# Patient Record
Sex: MALE | Race: WHITE | NOT HISPANIC OR LATINO | Employment: FULL TIME | URBAN - METROPOLITAN AREA
[De-identification: names, ages, dates, MRNs, and addresses within clinical notes are randomized per-mention and may not be internally consistent; named-entity substitution may affect disease eponyms.]

---

## 2018-03-23 ENCOUNTER — HOSPITAL ENCOUNTER (EMERGENCY)
Facility: HOSPITAL | Age: 58
Discharge: HOME | End: 2018-03-23
Attending: EMERGENCY MEDICINE | Admitting: EMERGENCY MEDICINE

## 2018-03-23 VITALS
SYSTOLIC BLOOD PRESSURE: 162 MMHG | DIASTOLIC BLOOD PRESSURE: 78 MMHG | BODY MASS INDEX: 33.88 KG/M2 | HEART RATE: 87 BPM | TEMPERATURE: 98.4 F | OXYGEN SATURATION: 99 % | WEIGHT: 242 LBS | HEIGHT: 71 IN | RESPIRATION RATE: 16 BRPM

## 2018-03-23 DIAGNOSIS — I82.432 ACUTE DEEP VEIN THROMBOSIS (DVT) OF POPLITEAL VEIN OF LEFT LOWER EXTREMITY (HCC): Primary | ICD-10-CM

## 2018-03-23 PROCEDURE — 99283 EMERGENCY DEPT VISIT LOW MDM: CPT

## 2018-03-23 RX ORDER — NEBIVOLOL 20 MG/1
20 TABLET ORAL 2 TIMES DAILY
COMMUNITY
End: 2022-03-09

## 2018-03-23 RX ORDER — CYCLOBENZAPRINE HCL 10 MG
10 TABLET ORAL 3 TIMES DAILY PRN
COMMUNITY

## 2018-03-23 RX ADMIN — APIXABAN 10 MG: 5 TABLET, FILM COATED ORAL at 22:42

## 2022-03-09 ENCOUNTER — LAB (OUTPATIENT)
Dept: LAB | Facility: HOSPITAL | Age: 62
End: 2022-03-09

## 2022-03-09 ENCOUNTER — OFFICE VISIT (OUTPATIENT)
Dept: ENDOCRINOLOGY | Facility: CLINIC | Age: 62
End: 2022-03-09

## 2022-03-09 VITALS
HEIGHT: 71 IN | DIASTOLIC BLOOD PRESSURE: 79 MMHG | SYSTOLIC BLOOD PRESSURE: 125 MMHG | OXYGEN SATURATION: 98 % | HEART RATE: 92 BPM | BODY MASS INDEX: 32.76 KG/M2 | WEIGHT: 234 LBS

## 2022-03-09 DIAGNOSIS — E29.1 HYPOGONADISM MALE: ICD-10-CM

## 2022-03-09 DIAGNOSIS — E11.65 UNCONTROLLED TYPE 2 DIABETES MELLITUS WITH HYPERGLYCEMIA: Primary | ICD-10-CM

## 2022-03-09 DIAGNOSIS — I10 BENIGN HYPERTENSION: ICD-10-CM

## 2022-03-09 DIAGNOSIS — E89.2 HISTORY OF PARATHYROIDECTOMY: ICD-10-CM

## 2022-03-09 PROBLEM — Z98.890 HISTORY OF PARATHYROIDECTOMY: Status: ACTIVE | Noted: 2022-03-09

## 2022-03-09 PROBLEM — Z90.89 HISTORY OF PARATHYROIDECTOMY: Status: ACTIVE | Noted: 2022-03-09

## 2022-03-09 LAB
ESTRADIOL SERPL HS-MCNC: 15.4 PG/ML
EXPIRATION DATE: ABNORMAL
FSH SERPL-ACNC: 9.15 MIU/ML
GLUCOSE BLDC GLUCOMTR-MCNC: 154 MG/DL (ref 70–130)
HBA1C MFR BLD: 8.1 %
LH SERPL-ACNC: 4.92 MIU/ML
Lab: ABNORMAL
PROLACTIN SERPL-MCNC: 9.17 NG/ML (ref 4.04–15.2)

## 2022-03-09 PROCEDURE — 82947 ASSAY GLUCOSE BLOOD QUANT: CPT | Performed by: INTERNAL MEDICINE

## 2022-03-09 PROCEDURE — 84403 ASSAY OF TOTAL TESTOSTERONE: CPT

## 2022-03-09 PROCEDURE — 82670 ASSAY OF TOTAL ESTRADIOL: CPT

## 2022-03-09 PROCEDURE — 83002 ASSAY OF GONADOTROPIN (LH): CPT

## 2022-03-09 PROCEDURE — 84402 ASSAY OF FREE TESTOSTERONE: CPT

## 2022-03-09 PROCEDURE — 84146 ASSAY OF PROLACTIN: CPT

## 2022-03-09 PROCEDURE — 82627 DEHYDROEPIANDROSTERONE: CPT

## 2022-03-09 PROCEDURE — 83001 ASSAY OF GONADOTROPIN (FSH): CPT

## 2022-03-09 PROCEDURE — 99204 OFFICE O/P NEW MOD 45 MIN: CPT | Performed by: INTERNAL MEDICINE

## 2022-03-09 RX ORDER — CYCLOBENZAPRINE HCL 5 MG
TABLET ORAL
COMMUNITY
Start: 2022-01-20 | End: 2022-03-09

## 2022-03-09 RX ORDER — ALBUTEROL SULFATE 90 UG/1
180 AEROSOL, METERED RESPIRATORY (INHALATION)
COMMUNITY

## 2022-03-09 RX ORDER — AMLODIPINE BESYLATE 5 MG/1
10 TABLET ORAL DAILY
COMMUNITY
Start: 2022-02-24 | End: 2023-01-17

## 2022-03-09 RX ORDER — ASPIRIN 81 MG/1
81 TABLET ORAL
COMMUNITY

## 2022-03-09 RX ORDER — MOMETASONE FUROATE 50 UG/1
SPRAY, METERED NASAL
COMMUNITY
Start: 2021-12-17

## 2022-03-09 RX ORDER — NEBIVOLOL 20 MG/1
TABLET ORAL EVERY 24 HOURS
COMMUNITY

## 2022-03-09 RX ORDER — TRIAMCINOLONE ACETONIDE 1 MG/ML
LOTION TOPICAL
COMMUNITY

## 2022-03-09 RX ORDER — HYDROCHLOROTHIAZIDE 12.5 MG/1
12.5 TABLET ORAL EVERY MORNING
COMMUNITY
Start: 2022-02-24

## 2022-03-09 RX ORDER — IRBESARTAN AND HYDROCHLOROTHIAZIDE 300; 12.5 MG/1; MG/1
1 TABLET, FILM COATED ORAL DAILY
COMMUNITY
Start: 2022-02-24

## 2022-03-09 RX ORDER — METFORMIN HYDROCHLORIDE 500 MG/1
TABLET, EXTENDED RELEASE ORAL
COMMUNITY
Start: 2022-02-24

## 2022-03-09 RX ORDER — PREDNISOLONE ACETATE 10 MG/ML
SUSPENSION/ DROPS OPHTHALMIC
COMMUNITY
Start: 2021-12-13

## 2022-03-09 NOTE — PROGRESS NOTES
"     Office Note      Date: 2022  Patient Name: Dom Medina  MRN: [id number]  : 1960    Chief Complaint   Patient presents with   • Diabetes       History of Present Illness:   Dom Medina is a 61 y.o. male who presents for Diabetes type 2. Diagnosed in: . Treated in past with diet. Current treatments: metformin. Number of insulin shots per day: none. Checks blood sugar none times a day. Has low blood sugar: no. Aspirin use: Yes. Statin use: No -  . ACE-I/ARB use: Yes.  Last eye exam: 2021.    He has noted higher glucose readings after COVID-19 infection.  He was hospitalized for the infection.  He was treated with insulin while in the hospital.  He was started on metformin after recent labs showed A1c of 8.1% and has felt poorly since starting this.      He hasn't had any formal DM education.      He reports being diagnosed with low testosterone.  He hasn't started any treatment for this.      Subjective      Diabetic Complications:  Eyes: No  Kidneys: No  Feet: No  Heart: No    Diet and Exercise:  Meals per day: 3  Minutes of exercise per week: 0 mins.    Review of Systems:   Review of Systems   Constitutional: Positive for fatigue.   Cardiovascular: Negative.    Gastrointestinal: Positive for constipation.   Endocrine: Negative.        The following portions of the patient's history were reviewed and updated as appropriate: allergies, current medications, past family history, past medical history, past social history, past surgical history and problem list.    Objective     Visit Vitals  /79   Pulse 92   Ht 180.3 cm (71\")   Wt 106 kg (234 lb)   SpO2 98%   BMI 32.64 kg/m²       Physical Exam:  Physical Exam  Constitutional:       Appearance: Normal appearance.   HENT:      Head: Normocephalic and atraumatic.   Eyes:      Extraocular Movements: Extraocular movements intact.      Conjunctiva/sclera: Conjunctivae normal.      Pupils: Pupils are equal, round, and reactive to light. "   Neck:      Thyroid: No thyroid mass, thyromegaly or thyroid tenderness.   Cardiovascular:      Rate and Rhythm: Normal rate and regular rhythm.      Pulses: Normal pulses.           Dorsalis pedis pulses are 2+ on the right side and 2+ on the left side.        Posterior tibial pulses are 2+ on the right side and 2+ on the left side.      Heart sounds: Normal heart sounds.   Pulmonary:      Effort: Pulmonary effort is normal.      Breath sounds: Normal breath sounds.   Abdominal:      General: Bowel sounds are normal.      Palpations: Abdomen is soft.   Musculoskeletal:         General: Normal range of motion.      Cervical back: Normal range of motion and neck supple.      Right foot: Bunion present.      Left foot: Bunion present.   Feet:      Right foot:      Protective Sensation: 5 sites tested. 5 sites sensed.      Skin integrity: Skin integrity normal.      Toenail Condition: Right toenails are normal.      Left foot:      Protective Sensation: 5 sites tested. 5 sites sensed.      Skin integrity: Skin integrity normal.      Toenail Condition: Left toenails are normal.   Lymphadenopathy:      Cervical: No cervical adenopathy.   Skin:     General: Skin is warm and dry.   Neurological:      General: No focal deficit present.      Mental Status: He is alert.   Psychiatric:         Mood and Affect: Mood normal.         Behavior: Behavior normal.         Thought Content: Thought content normal.         Judgment: Judgment normal.         Labs:    HbA1c  Hemoglobin A1C   Date Value Ref Range Status   02/23/2022 8.1  Final   .    CMP  No results found for: GLUCOSE, BUN, CREATININE, EGFRIFNONA, EGFRIFAFRI, BCR, K, CO2, CALCIUM, PROTENTOTREF, LABIL2, BILIRUBIN, AST, ALT     Lipid Panel        TSH  No results found for: TSH, FREET4     Hemoglobin A1C  Lab Results   Component Value Date    HGBA1C 8.1 02/23/2022        Microalbumin/Creatinine  No results found for: MALBCRERATI        Assessment / Plan      Assessment &  Plan:  Diagnoses and all orders for this visit:    1. Uncontrolled type 2 diabetes mellitus with hyperglycemia (HCC) (Primary)  Assessment & Plan:  Diabetes is worsening.  He had been diet controlled.  Recent A1c was high and metformin has been started.    Continue current treatment regimen.  Check FSBS once a day at alternating times.  Diabetes will be reassessed in 3 months.    Orders:  -     POC Glucose, Blood    2. Benign hypertension  Assessment & Plan:  BP okay.  Continue current meds.        3. Hypogonadism male  Assessment & Plan:  He reports h/o low testo in the past.  Check labs today.    Orders:  -     Testosterone, Free, Total; Future  -     FSH & LH; Future  -     Estradiol; Future  -     DHEA-Sulfate; Future  -     Prolactin; Future    4. History of parathyroidectomy (HCC)  Assessment & Plan:  Recent calcium and vit D levels are okay.         Return in about 3 months (around 6/9/2022) for Recheck with A1c, CMP, testo.    Satnam Tierney MD   03/09/2022

## 2022-03-09 NOTE — ASSESSMENT & PLAN NOTE
Diabetes is worsening.  He had been diet controlled.  Recent A1c was high and metformin has been started.    Continue current treatment regimen.  Check FSBS once a day at alternating times.  Diabetes will be reassessed in 3 months.

## 2022-03-11 LAB — DHEA-S SERPL-MCNC: 34.6 UG/DL (ref 48.9–344.2)

## 2022-03-12 LAB
TESTOST FREE SERPL-MCNC: 3.6 PG/ML (ref 6.6–18.1)
TESTOST SERPL-MCNC: 380 NG/DL (ref 264–916)

## 2022-09-09 ENCOUNTER — LAB (OUTPATIENT)
Dept: LAB | Facility: HOSPITAL | Age: 62
End: 2022-09-09

## 2022-09-09 ENCOUNTER — OFFICE VISIT (OUTPATIENT)
Dept: ENDOCRINOLOGY | Facility: CLINIC | Age: 62
End: 2022-09-09

## 2022-09-09 VITALS
OXYGEN SATURATION: 98 % | SYSTOLIC BLOOD PRESSURE: 120 MMHG | BODY MASS INDEX: 32.14 KG/M2 | WEIGHT: 229.6 LBS | HEIGHT: 71 IN | DIASTOLIC BLOOD PRESSURE: 80 MMHG | HEART RATE: 85 BPM

## 2022-09-09 DIAGNOSIS — E89.2 HISTORY OF PARATHYROIDECTOMY: ICD-10-CM

## 2022-09-09 DIAGNOSIS — E29.1 HYPOGONADISM MALE: ICD-10-CM

## 2022-09-09 DIAGNOSIS — I10 BENIGN HYPERTENSION: ICD-10-CM

## 2022-09-09 DIAGNOSIS — E11.65 UNCONTROLLED TYPE 2 DIABETES MELLITUS WITH HYPERGLYCEMIA: Primary | ICD-10-CM

## 2022-09-09 PROBLEM — Z90.89 HISTORY OF PARATHYROIDECTOMY: Status: RESOLVED | Noted: 2022-03-09 | Resolved: 2022-09-09

## 2022-09-09 PROBLEM — Z98.890 HISTORY OF PARATHYROIDECTOMY: Status: RESOLVED | Noted: 2022-03-09 | Resolved: 2022-09-09

## 2022-09-09 LAB — HBA1C MFR BLD: 7 %

## 2022-09-09 PROCEDURE — 84403 ASSAY OF TOTAL TESTOSTERONE: CPT

## 2022-09-09 PROCEDURE — 84402 ASSAY OF FREE TESTOSTERONE: CPT

## 2022-09-09 PROCEDURE — 99214 OFFICE O/P EST MOD 30 MIN: CPT | Performed by: INTERNAL MEDICINE

## 2022-09-09 RX ORDER — LANCETS 33 GAUGE
EACH MISCELLANEOUS
COMMUNITY
Start: 2022-06-29

## 2022-09-09 RX ORDER — AMLODIPINE BESYLATE 10 MG/1
10 TABLET ORAL DAILY
COMMUNITY
Start: 2022-06-29

## 2022-09-09 RX ORDER — FLUTICASONE PROPIONATE 50 MCG
SPRAY, SUSPENSION (ML) NASAL
COMMUNITY
Start: 2022-07-07

## 2022-09-09 RX ORDER — BLOOD SUGAR DIAGNOSTIC
STRIP MISCELLANEOUS
COMMUNITY
Start: 2022-06-29

## 2022-09-09 NOTE — PROGRESS NOTES
"     Office Note      Date: 2022  Patient Name: Dom Medina  MRN: [id number]  : 1960    Chief Complaint   Patient presents with   • Follow-up     3 Month Follow Up - Uncontrolled type 2 diabetes mellitus with hyperglycemia (HCC)  [facility] get [facility] to the office       History of Present Illness:   Dom Medina is a 62 y.o. male who presents for Diabetes type 2. Diagnosed in: . Treated in past with diet. Current treatments: metformin. Number of insulin shots per day: none. Checks blood sugar none times a day. Has low blood sugar: no. Aspirin use: Yes. Statin use: No -  . ACE-I/ARB use: Yes.  Change in health since last visit: vertigo. Last eye exam: spring 2022.     He reports being diagnosed with low testosterone.  He hasn't been on any treatment.  At the last visit here, the total testo was back to normal.  The free testo was mildly low.  Pituitary and adrenal tests were okay.    He had labs done last month.  A1c was 7.0%.    Subjective      Diabetic Complications:  Eyes: No  Kidneys: No  Feet: No  Heart: No    Diet and Exercise:  Meals per day: 3  Minutes of exercise per week: 0 mins.    Review of Systems:   Review of Systems   Constitutional: Negative.    Cardiovascular: Negative.    Gastrointestinal: Negative.    Endocrine: Negative.        The following portions of the patient's history were reviewed and updated as appropriate: allergies, current medications, past family history, past medical history, past social history, past surgical history and problem list.    Objective       Visit Vitals  /80 (BP Location: Left arm, Patient Position: Sitting)   Pulse 85   Ht 180.3 cm (70.98\")   Wt 104 kg (229 lb 9.6 oz)   SpO2 98%   BMI 32.04 kg/m²       Physical Exam:  Physical Exam  Constitutional:       Appearance: Normal appearance.   Neurological:      Mental Status: He is alert.         Labs:    HbA1c  Lab Results   Component Value Date    HGBA1C 8.1 " 02/23/2022       CMP  No results found for: GLUCOSE, BUN, CREATININE, EGFRIFNONA, EGFRIFAFRI, BCR, K, CO2, CALCIUM, PROTENTOTREF, LABIL2, BILIRUBIN, AST, ALT     Lipid Panel        TSH  No results found for: TSH, FREET4     Hemoglobin A1C  Lab Results   Component Value Date    HGBA1C 8.1 02/23/2022        Microalbumin/Creatinine  No results found for: MALBCRERATIO, CREATINIURIN, MICROALBUR        Assessment / Plan      Assessment & Plan:  Diagnoses and all orders for this visit:    1. Uncontrolled type 2 diabetes mellitus with hyperglycemia (HCC) (Primary)  Assessment & Plan:  Diabetes is improving with treatment.   Continue current treatment regimen.  Diabetes will be reassessed in 3 months.      2. Benign hypertension  Assessment & Plan:  Hypertension is unchanged.  Continue current treatment regimen.  Blood pressure will be reassessed at the next regular appointment.      3. Hypogonadism male  Assessment & Plan:  Check testo levels.      Orders:  -     Testosterone Free MS / Dialysis; Future    4. History of parathyroidectomy (HCC)  Assessment & Plan:  Recent calcium normal.        Return in about 3 months (around 12/9/2022) for Recheck with A1c.    Satnam Tierney MD   09/09/2022

## 2022-09-17 LAB
TESTOST FREE MFR SERPL: 1 %
TESTOST FREE SERPL-MCNC: 38 PG/ML
TESTOST SERPL-MCNC: 375 NG/DL

## 2023-01-17 ENCOUNTER — OFFICE VISIT (OUTPATIENT)
Dept: ENDOCRINOLOGY | Facility: CLINIC | Age: 63
End: 2023-01-17
Payer: COMMERCIAL

## 2023-01-17 VITALS
HEIGHT: 71 IN | DIASTOLIC BLOOD PRESSURE: 77 MMHG | OXYGEN SATURATION: 95 % | WEIGHT: 231 LBS | HEART RATE: 97 BPM | BODY MASS INDEX: 32.34 KG/M2 | SYSTOLIC BLOOD PRESSURE: 118 MMHG

## 2023-01-17 DIAGNOSIS — I10 BENIGN HYPERTENSION: ICD-10-CM

## 2023-01-17 DIAGNOSIS — E29.1 HYPOGONADISM MALE: ICD-10-CM

## 2023-01-17 DIAGNOSIS — E89.2 HISTORY OF PARATHYROIDECTOMY: ICD-10-CM

## 2023-01-17 DIAGNOSIS — E11.65 UNCONTROLLED TYPE 2 DIABETES MELLITUS WITH HYPERGLYCEMIA: Primary | ICD-10-CM

## 2023-01-17 LAB
EXPIRATION DATE: ABNORMAL
EXPIRATION DATE: NORMAL
GLUCOSE BLDC GLUCOMTR-MCNC: 242 MG/DL (ref 70–130)
HBA1C MFR BLD: 6.4 %
Lab: ABNORMAL
Lab: NORMAL

## 2023-01-17 PROCEDURE — 84402 ASSAY OF FREE TESTOSTERONE: CPT | Performed by: INTERNAL MEDICINE

## 2023-01-17 PROCEDURE — 99214 OFFICE O/P EST MOD 30 MIN: CPT | Performed by: INTERNAL MEDICINE

## 2023-01-17 PROCEDURE — 82947 ASSAY GLUCOSE BLOOD QUANT: CPT | Performed by: INTERNAL MEDICINE

## 2023-01-17 PROCEDURE — 83036 HEMOGLOBIN GLYCOSYLATED A1C: CPT | Performed by: INTERNAL MEDICINE

## 2023-01-17 PROCEDURE — 84403 ASSAY OF TOTAL TESTOSTERONE: CPT | Performed by: INTERNAL MEDICINE

## 2023-01-17 NOTE — PROGRESS NOTES
"     Office Note      Date: 2023  Patient Name: Dom Medina  MRN: [id number]  : 1960    Chief Complaint   Patient presents with   • Diabetes       History of Present Illness:   Dom Medina is a 62 y.o. male who presents for Diabetes type 2. Diagnosed in: . Treated in past with diet. Current treatments: metformin. Number of insulin shots per day: none. Checks blood sugar none times a day. Has low blood sugar: no. Aspirin use: Yes. Statin use: No -  . ACE-I/ARB use: Yes.  Change in health since last visit: none. Last eye exam: spring 2022.     He reports h/o low testosterone.  He hasn't been on any treatment.  At the last 2 visits here, the total testo was back to normal.  The free testo was mildly low.  Pituitary and adrenal tests have been okay.    Subjective      Diabetic Complications:  Eyes: No  Kidneys: No  Feet: No  Heart: No    Diet and Exercise:  Meals per day: 3  Minutes of exercise per week: 0 mins.    Review of Systems:   Review of Systems   Constitutional: Negative.    Cardiovascular: Negative.    Gastrointestinal: Positive for constipation.   Endocrine: Negative.        The following portions of the patient's history were reviewed and updated as appropriate: allergies, current medications, past family history, past medical history, past social history, past surgical history and problem list.    Objective       Visit Vitals  /77   Pulse 97   Ht 180.3 cm (71\")   Wt 105 kg (231 lb)   SpO2 95%   BMI 32.22 kg/m²       Physical Exam:  Physical Exam  Constitutional:       Appearance: Normal appearance.   Neurological:      Mental Status: He is alert.         Labs:    HbA1c  Lab Results   Component Value Date    HGBA1C 6.4 2023       CMP  No results found for: GLUCOSE, BUN, CREATININE, EGFRIFNONA, EGFRIFAFRI, BCR, K, CO2, CALCIUM, PROTENTOTREF, LABIL2, BILIRUBIN, AST, ALT     Lipid Panel        TSH  No results found for: TSH, FREET4     Hemoglobin A1C  Lab Results   Component " Value Date    HGBA1C 6.4 01/17/2023        Microalbumin/Creatinine  No results found for: MALBCRERATIO, CREATINIURIN, MICROALBUR        Assessment / Plan      Assessment & Plan:  Diagnoses and all orders for this visit:    1. Uncontrolled type 2 diabetes mellitus with hyperglycemia (HCC) (Primary)  Assessment & Plan:  Diabetes is improving with treatment.   Continue current treatment regimen.  Diabetes will be reassessed in 3 months.    Orders:  -     POC Glucose, Blood  -     POC Glycosylated Hemoglobin (Hb A1C)    2. Benign hypertension  Assessment & Plan:  Hypertension is unchanged.  Continue current treatment regimen.  Blood pressure will be reassessed at the next regular appointment.      3. Hypogonadism male  Assessment & Plan:  Check testo levels today.    Orders:  -     Testosterone Free MS / Dialysis; Future    4. History of parathyroidectomy (HCC)  Assessment & Plan:  Plan to check calcium next visit.      Current Outpatient Medications   Medication Instructions   • albuterol sulfate HFA (PROVENTIL HFA;VENTOLIN HFA;PROAIR HFA) 180 mcg, Inhalation   • amLODIPine (NORVASC) 10 mg, Oral, Daily   • apixaban (ELIQUIS) 5 MG tablet tablet Take 2 tablets by mouth twice daily for 7 daysFollowed by 1 tablet my mouth twice dailyDisp # 74 tablets   • aspirin 81 mg, Oral   • cyclobenzaprine (FLEXERIL) 10 mg, Oral, 3 Times Daily PRN   • fluticasone (FLONASE) 50 MCG/ACT nasal spray USE 1 TO 2 SPRAYS IN EACH NOSTRIL ONCE A DAY   • hydroCHLOROthiazide (HYDRODIURIL) 12.5 mg, Oral, Every Morning   • irbesartan-hydrochlorothiazide (AVALIDE) 300-12.5 MG tablet 1 tablet, Oral, Daily   • Lancets (OneTouch Delica Plus [id number]) misc USE TO CHECK BLOOD GLUCOSE ONCE DAILY   • metFORMIN ER (GLUCOPHAGE-XR) 500 MG 24 hr tablet TAKE ONE TABLET BY MOUTH ONCE DAILY WITH EVENING MEAL   • mometasone (NASONEX) 50 MCG/ACT nasal spray USE 2 SPRAY IN EACH NOSTRIL TWO TIMES A DAY (EVERY 12 HOURS)   • nebivolol (BYSTOLIC) 20 MG tablet Every 24  Hours   • OneTouch Verio test strip USE TO CHECK BLOOD GLUCOSE ONCE DAILY   • prednisoLONE acetate (PRED FORTE) 1 % ophthalmic suspension INSTILL 1 DROP INTO EACH AFFECTED EYE TWO TIMES A DAY (EVERY 12 HOURS) FOR 90 DAYS   • triamcinolone (KENALOG) 0.1 % lotion Topical      Return in about 3 months (around 4/17/2023) for Recheck with, A1c, CMP, lipid, TSH, microalbumin, foot exam.    Satnam Tierney MD   01/17/2023

## 2023-01-27 LAB
TESTOST FREE MFR SERPL: 1.2 %
TESTOST FREE SERPL-MCNC: 39 PG/ML
TESTOST SERPL-MCNC: 326 NG/DL

## 2023-06-13 ENCOUNTER — OFFICE VISIT (OUTPATIENT)
Dept: ENDOCRINOLOGY | Facility: CLINIC | Age: 63
End: 2023-06-13
Payer: COMMERCIAL

## 2023-06-13 VITALS
DIASTOLIC BLOOD PRESSURE: 76 MMHG | SYSTOLIC BLOOD PRESSURE: 120 MMHG | HEART RATE: 74 BPM | OXYGEN SATURATION: 98 % | HEIGHT: 71 IN | BODY MASS INDEX: 32.09 KG/M2 | WEIGHT: 229.2 LBS

## 2023-06-13 DIAGNOSIS — E29.1 HYPOGONADISM MALE: ICD-10-CM

## 2023-06-13 DIAGNOSIS — I10 BENIGN HYPERTENSION: ICD-10-CM

## 2023-06-13 DIAGNOSIS — E11.65 UNCONTROLLED TYPE 2 DIABETES MELLITUS WITH HYPERGLYCEMIA: Primary | ICD-10-CM

## 2023-06-13 DIAGNOSIS — E89.2 HISTORY OF PARATHYROIDECTOMY: ICD-10-CM

## 2023-06-13 LAB
ALBUMIN UR-MCNC: 8.7 MG/DL
CREAT UR-MCNC: 76.1 MG/DL
EXPIRATION DATE: ABNORMAL
EXPIRATION DATE: NORMAL
GLUCOSE BLDC GLUCOMTR-MCNC: 145 MG/DL (ref 70–130)
HBA1C MFR BLD: 6.3 %
HBA1C MFR BLD: 7 %
Lab: ABNORMAL
Lab: NORMAL
MICROALBUMIN/CREAT UR: 114.3 MG/G

## 2023-06-13 PROCEDURE — 82570 ASSAY OF URINE CREATININE: CPT | Performed by: INTERNAL MEDICINE

## 2023-06-13 PROCEDURE — 82043 UR ALBUMIN QUANTITATIVE: CPT | Performed by: INTERNAL MEDICINE

## 2023-06-13 RX ORDER — TAMSULOSIN HYDROCHLORIDE 0.4 MG/1
1 CAPSULE ORAL DAILY
COMMUNITY
Start: 2023-02-22

## 2023-06-13 RX ORDER — OMEPRAZOLE 40 MG/1
40 CAPSULE, DELAYED RELEASE ORAL AS NEEDED
COMMUNITY

## 2023-06-13 RX ORDER — BLOOD-GLUCOSE METER
EACH MISCELLANEOUS EVERY 24 HOURS
COMMUNITY

## 2023-06-13 RX ORDER — LANCETS 33 GAUGE
EACH MISCELLANEOUS EVERY 24 HOURS
COMMUNITY

## 2023-06-13 RX ORDER — MELOXICAM 15 MG/1
15 TABLET ORAL AS NEEDED
COMMUNITY

## 2023-06-13 RX ORDER — CETIRIZINE HYDROCHLORIDE 10 MG/1
10 TABLET ORAL EVERY 24 HOURS
COMMUNITY

## 2023-06-13 RX ORDER — MECLIZINE HYDROCHLORIDE 25 MG/1
TABLET ORAL AS NEEDED
COMMUNITY

## 2023-06-13 NOTE — PROGRESS NOTES
"     Office Note      Date: 2023  Patient Name: Dom Medina  MRN: [id number]  : 1960    Chief Complaint   Patient presents with    Diabetes       History of Present Illness:   Dom Medina is a 62 y.o. male who presents for Diabetes type 2. Diagnosed in: . Treated in past with diet. Current treatments: metformin. Number of insulin shots per day: none. Checks blood sugar none times a day. Has low blood sugar: no. Aspirin use: Yes. Statin use: No -  . ACE-I/ARB use: Yes.  Change in health since last visit: none. Last eye exam: 2022.     He had labs done last month.  The A1c was 7.0%.  Lipids were at goal.  CMP was okay.  TSH was normal.  They have been under some family stress recently.    Subjective      Diabetic Complications:  Eyes: No  Kidneys: No  Feet: No  Heart: No    Diet and Exercise:  Meals per day: 3  Minutes of exercise per week: 0 mins.    Review of Systems:   Review of Systems   Constitutional: Negative.    Cardiovascular: Negative.    Gastrointestinal: Negative.    Endocrine: Negative.      The following portions of the patient's history were reviewed and updated as appropriate: allergies, current medications, past family history, past medical history, past social history, past surgical history, and problem list.    Objective       Visit Vitals  /76   Pulse 74   Ht 180.3 cm (71\")   Wt 104 kg (229 lb 3.2 oz)   SpO2 98%   BMI 31.97 kg/m²       Physical Exam:  Physical Exam  Constitutional:       Appearance: Normal appearance.   Cardiovascular:      Pulses:           Dorsalis pedis pulses are 2+ on the right side and 2+ on the left side.        Posterior tibial pulses are 2+ on the right side and 2+ on the left side.   Musculoskeletal:      Right foot: Bunion present.      Left foot: Bunion present.   Feet:      Right foot:      Protective Sensation: 5 sites tested.  5 sites sensed.      Skin integrity: Skin integrity normal.      Toenail Condition: Right toenails are " normal.      Left foot:      Protective Sensation: 5 sites tested.  5 sites sensed.      Skin integrity: Skin integrity normal.      Toenail Condition: Left toenails are normal.   Neurological:      Mental Status: He is alert.       Labs:    HbA1c  Lab Results   Component Value Date    HGBA1C 6.3 06/13/2023       CMP  No results found for: GLUCOSE, BUN, CREATININE, EGFRIFNONA, EGFRIFAFRI, BCR, K, CO2, CALCIUM, PROTENTOTREF, LABIL2, BILIRUBIN, AST, ALT     Lipid Panel        TSH  No results found for: TSH, FREET4     Hemoglobin A1C  Lab Results   Component Value Date    HGBA1C 6.3 06/13/2023        Microalbumin/Creatinine  No results found for: MALBCRERATIO, CREATINIURIN, MICROALBUR        Assessment / Plan      Assessment & Plan:  Diagnoses and all orders for this visit:    1. Uncontrolled type 2 diabetes mellitus with hyperglycemia (Primary)  Assessment & Plan:  Diabetes is improving with treatment.   Continue current treatment regimen.  Diabetes will be reassessed in 3 months.    Orders:  -     Cancel: Comprehensive Metabolic Panel; Future  -     Cancel: Lipid Panel; Future  -     Microalbumin / Creatinine Urine Ratio - Urine, Clean Catch; Future  -     Cancel: TSH; Future  -     POC Glucose, Blood  -     POC Glycosylated Hemoglobin (Hb A1C)    2. Benign hypertension  Assessment & Plan:  Hypertension is unchanged.  Continue current treatment regimen.  Blood pressure will be reassessed at the next regular appointment.      3. Hypogonadism male  Assessment & Plan:  Testo levels were okay last visit.  Plan to recheck next visit.      4. History of parathyroidectomy  Assessment & Plan:  Recent calcium was normal.        Current Outpatient Medications   Medication Instructions    albuterol sulfate HFA (PROVENTIL HFA;VENTOLIN HFA;PROAIR HFA) 180 mcg, Inhalation    amLODIPine (NORVASC) 10 mg, Oral, Daily    aspirin 81 mg, Oral    Blood Glucose Monitoring Suppl (OneTouch Verio Flex System) w/Device kit Every 24 Hours     cetirizine (ZYRTEC) 10 mg, Every 24 Hours    cyclobenzaprine (FLEXERIL) 10 mg, Oral, 3 Times Daily PRN    fluticasone (FLONASE) 50 MCG/ACT nasal spray USE 1 TO 2 SPRAYS IN EACH NOSTRIL ONCE A DAY    hydroCHLOROthiazide (HYDRODIURIL) 12.5 mg, Oral, Every Morning    irbesartan-hydrochlorothiazide (AVALIDE) 300-12.5 MG tablet 1 tablet, Oral, Daily    Lancets (OneTouch Delica Plus [id number]) misc USE TO CHECK BLOOD GLUCOSE ONCE DAILY    Lancets (OneTouch Delica Plus [id number]) misc Every 24 Hours    meclizine (ANTIVERT) 25 MG tablet As Needed    meloxicam (MOBIC) 15 mg, Oral, As Needed    metFORMIN ER (GLUCOPHAGE-XR) 500 MG 24 hr tablet TAKE ONE TABLET BY MOUTH ONCE DAILY WITH EVENING MEAL    mometasone (NASONEX) 50 MCG/ACT nasal spray USE 2 SPRAY IN EACH NOSTRIL TWO TIMES A DAY (EVERY 12 HOURS)    nebivolol (BYSTOLIC) 20 MG tablet Every 24 Hours    omeprazole (PRILOSEC) 40 mg, Oral, As Needed    OneTouch Verio test strip USE TO CHECK BLOOD GLUCOSE ONCE DAILY    prednisoLONE acetate (PRED FORTE) 1 % ophthalmic suspension INSTILL 1 DROP INTO EACH AFFECTED EYE TWO TIMES A DAY (EVERY 12 HOURS) FOR 90 DAYS    tamsulosin (FLOMAX) 0.4 MG capsule 24 hr capsule 1 capsule, Oral, Daily      Return in about 3 months (around 9/13/2023) for Recheck with A1c, testo.    Satnam Tierney MD   06/13/2023

## 2023-11-13 ENCOUNTER — OFFICE VISIT (OUTPATIENT)
Dept: ENDOCRINOLOGY | Facility: CLINIC | Age: 63
End: 2023-11-13
Payer: COMMERCIAL

## 2023-11-13 VITALS
OXYGEN SATURATION: 97 % | HEIGHT: 71 IN | WEIGHT: 226 LBS | BODY MASS INDEX: 31.64 KG/M2 | HEART RATE: 87 BPM | SYSTOLIC BLOOD PRESSURE: 120 MMHG | DIASTOLIC BLOOD PRESSURE: 64 MMHG

## 2023-11-13 DIAGNOSIS — I10 BENIGN HYPERTENSION: ICD-10-CM

## 2023-11-13 DIAGNOSIS — E89.2 HISTORY OF PARATHYROIDECTOMY: ICD-10-CM

## 2023-11-13 DIAGNOSIS — E11.65 UNCONTROLLED TYPE 2 DIABETES MELLITUS WITH HYPERGLYCEMIA: Primary | ICD-10-CM

## 2023-11-13 DIAGNOSIS — E29.1 HYPOGONADISM MALE: ICD-10-CM

## 2023-11-13 LAB
EXPIRATION DATE: ABNORMAL
GLUCOSE BLDC GLUCOMTR-MCNC: 243 MG/DL (ref 70–130)
Lab: ABNORMAL

## 2023-11-13 PROCEDURE — 82947 ASSAY GLUCOSE BLOOD QUANT: CPT | Performed by: INTERNAL MEDICINE

## 2023-11-13 PROCEDURE — 99214 OFFICE O/P EST MOD 30 MIN: CPT | Performed by: INTERNAL MEDICINE

## 2023-11-13 PROCEDURE — 84402 ASSAY OF FREE TESTOSTERONE: CPT | Performed by: INTERNAL MEDICINE

## 2023-11-13 PROCEDURE — 84403 ASSAY OF TOTAL TESTOSTERONE: CPT | Performed by: INTERNAL MEDICINE

## 2023-11-13 PROCEDURE — 82570 ASSAY OF URINE CREATININE: CPT | Performed by: INTERNAL MEDICINE

## 2023-11-13 PROCEDURE — 82043 UR ALBUMIN QUANTITATIVE: CPT | Performed by: INTERNAL MEDICINE

## 2023-11-13 RX ORDER — METFORMIN HYDROCHLORIDE 500 MG/1
500 TABLET, EXTENDED RELEASE ORAL DAILY
Qty: 90 TABLET | Refills: 3 | Status: SHIPPED | OUTPATIENT
Start: 2023-11-13

## 2023-11-13 NOTE — PROGRESS NOTES
"     Office Note      Date: 2023  Patient Name: Dom Medina  MRN: [id number]  : 1960    Chief Complaint   Patient presents with    Diabetes     Uncontrolled type 2 diabetes mellitus with hyperglycemia         History of Present Illness:   Dom Medina is a 63 y.o. male who presents for Diabetes type 2. Diagnosed in: . Treated in past with diet. Current treatments: metformin. Number of insulin shots per day: none. Checks blood sugar none times a day. Has low blood sugar: no. Aspirin use: No. Statin use: No. ACE-I/ARB use: Yes.  Change in health since last visit: none. Last eye exam: 2022.      Subjective      Diabetic Complications:  Eyes: No  Kidneys: No  Feet: No  Heart: No    Diet and Exercise:  Meals per day: 3  Minutes of exercise per week: 0 mins.    Review of Systems:   Review of Systems   Constitutional: Negative.    Cardiovascular: Negative.    Gastrointestinal: Negative.    Endocrine: Negative.        The following portions of the patient's history were reviewed and updated as appropriate: allergies, current medications, past family history, past medical history, past social history, past surgical history, and problem list.    Objective     Visit Vitals  /64 (BP Location: Right arm, Patient Position: Sitting, Cuff Size: Adult)   Pulse 87   Ht 180.3 cm (71\")   Wt 103 kg (226 lb)   SpO2 97%   BMI 31.52 kg/m²       Physical Exam:  Physical Exam  Constitutional:       Appearance: Normal appearance.   Cardiovascular:      Pulses:           Dorsalis pedis pulses are 2+ on the right side and 2+ on the left side.        Posterior tibial pulses are 2+ on the right side and 2+ on the left side.   Musculoskeletal:      Right foot: Bunion present.      Left foot: Bunion present.   Feet:      Right foot:      Protective Sensation: 5 sites tested.  5 sites sensed.      Skin integrity: Skin integrity normal.      Toenail Condition: Right toenails are normal.      Left foot:      " "Protective Sensation: 5 sites tested.  5 sites sensed.      Skin integrity: Skin integrity normal.      Toenail Condition: Left toenails are normal.   Neurological:      Mental Status: He is alert.         Labs:    HbA1c  Lab Results   Component Value Date    HGBA1C 6.3 06/13/2023       CMP  No results found for: \"GLUCOSE\", \"BUN\", \"CREATININE\", \"EGFRIFNONA\", \"EGFRIFAFRI\", \"BCR\", \"K\", \"CO2\", \"CALCIUM\", \"PROTENTOTREF\", \"LABIL2\", \"BILIRUBIN\", \"AST\", \"ALT\"     Lipid Panel        TSH  No results found for: \"TSH\", \"FREET4\"     Hemoglobin A1C  Lab Results   Component Value Date    HGBA1C 6.3 06/13/2023        Microalbumin/Creatinine  Lab Results   Component Value Date    MALBCRERATIO 114.3 06/13/2023    MICROALBUR 8.7 06/13/2023           Assessment / Plan      Assessment & Plan:  Diagnoses and all orders for this visit:    1. Uncontrolled type 2 diabetes mellitus with hyperglycemia (Primary)  Assessment & Plan:  Diabetes is unchanged.   Continue current treatment regimen.  Diabetes will be reassessed in 3 months.    Recent A1c was good at 6.6%.    Orders:  -     Cancel: POC Glycosylated Hemoglobin (Hb A1C)  -     POC Glucose, Blood  -     Microalbumin / Creatinine Urine Ratio - Urine, Clean Catch; Future    2. Benign hypertension  Assessment & Plan:  Hypertension is unchanged.  Continue current treatment regimen.  Blood pressure will be reassessed at the next regular appointment.      3. Hypogonadism male  Assessment & Plan:  He is seeing urologist (Dr Lara).  They asked about T replacement.  The pt would prefer to see Dr Lara for treatment (closer to home) if needed.  Will check T today.    Orders:  -     Testosterone Free MS / Dialysis; Future    4. History of parathyroidectomy  Assessment & Plan:  Calcium has been normal.  Continue to monitor this.  Recent calcium was normal.      Other orders  -     metFORMIN ER (GLUCOPHAGE-XR) 500 MG 24 hr tablet; Take 1 tablet by mouth Daily.  Dispense: 90 tablet; Refill: " 3      Current Outpatient Medications   Medication Instructions    albuterol sulfate HFA (PROVENTIL HFA;VENTOLIN HFA;PROAIR HFA) 180 mcg, Inhalation    amLODIPine (NORVASC) 10 mg, Oral, Daily    Blood Glucose Monitoring Suppl (OneTouch Verio Flex System) w/Device kit Every 24 Hours    cetirizine (ZYRTEC) 10 mg, Every 24 Hours    cyclobenzaprine (FLEXERIL) 10 mg, Oral, 3 Times Daily PRN    fluticasone (FLONASE) 50 MCG/ACT nasal spray USE 1 TO 2 SPRAYS IN EACH NOSTRIL ONCE A DAY    hydroCHLOROthiazide (HYDRODIURIL) 12.5 mg, Oral, Every Morning    irbesartan-hydrochlorothiazide (AVALIDE) 300-12.5 MG tablet 1 tablet, Oral, Daily    Lancets (OneTouch Delica Plus [id number]) misc USE TO CHECK BLOOD GLUCOSE ONCE DAILY    Lancets (OneTouch Delica Plus [id number]) misc Every 24 Hours    meloxicam (MOBIC) 15 mg, Oral, As Needed    metFORMIN ER (GLUCOPHAGE-XR) 500 mg, Oral, Daily    Mirabegron ER (MYRBETRIQ) 25 mg, Oral, Daily    mometasone (NASONEX) 50 MCG/ACT nasal spray USE 2 SPRAY IN EACH NOSTRIL TWO TIMES A DAY (EVERY 12 HOURS)    nebivolol (BYSTOLIC) 20 MG tablet Every 24 Hours    OneTouch Verio test strip USE TO CHECK BLOOD GLUCOSE ONCE DAILY    prednisoLONE acetate (PRED FORTE) 1 % ophthalmic suspension INSTILL 1 DROP INTO EACH AFFECTED EYE TWO TIMES A DAY (EVERY 12 HOURS) FOR 90 DAYS    tamsulosin (FLOMAX) 0.4 MG capsule 24 hr capsule 1 capsule, Oral, Daily      Return in about 6 months (around 5/13/2024) for Recheck with A1c.    Satnam Tierney MD   11/13/2023

## 2023-11-13 NOTE — ASSESSMENT & PLAN NOTE
Diabetes is unchanged.   Continue current treatment regimen.  Diabetes will be reassessed in 3 months.    Recent A1c was good at 6.6%.

## 2023-11-13 NOTE — ASSESSMENT & PLAN NOTE
He is seeing urologist (Dr Lara).  They asked about T replacement.  The pt would prefer to see Dr Lara for treatment (closer to home) if needed.  Will check T today.

## 2023-11-14 LAB
ALBUMIN UR-MCNC: 7.8 MG/DL
CREAT UR-MCNC: 90.1 MG/DL
HBA1C MFR BLD: 6.6 %
MICROALBUMIN/CREAT UR: 86.6 MG/G (ref 0–29)

## 2023-11-20 LAB
TESTOST FREE MFR SERPL: 1.4 %
TESTOST FREE SERPL-MCNC: 45 PG/ML
TESTOST SERPL-MCNC: 323 NG/DL

## 2024-05-15 ENCOUNTER — OFFICE VISIT (OUTPATIENT)
Dept: ENDOCRINOLOGY | Facility: CLINIC | Age: 64
End: 2024-05-15
Payer: COMMERCIAL

## 2024-05-15 VITALS
OXYGEN SATURATION: 100 % | HEART RATE: 72 BPM | WEIGHT: 226.6 LBS | HEIGHT: 71 IN | SYSTOLIC BLOOD PRESSURE: 120 MMHG | BODY MASS INDEX: 31.72 KG/M2 | DIASTOLIC BLOOD PRESSURE: 68 MMHG

## 2024-05-15 DIAGNOSIS — Z98.890 HISTORY OF PARATHYROIDECTOMY: ICD-10-CM

## 2024-05-15 DIAGNOSIS — E11.65 TYPE 2 DIABETES MELLITUS WITH HYPERGLYCEMIA, WITHOUT LONG-TERM CURRENT USE OF INSULIN: Primary | ICD-10-CM

## 2024-05-15 DIAGNOSIS — I10 BENIGN HYPERTENSION: ICD-10-CM

## 2024-05-15 DIAGNOSIS — Z90.89 HISTORY OF PARATHYROIDECTOMY: ICD-10-CM

## 2024-05-15 DIAGNOSIS — R80.9 MICROALBUMINURIA: ICD-10-CM

## 2024-05-15 LAB
EXPIRATION DATE: ABNORMAL
EXPIRATION DATE: ABNORMAL
GLUCOSE BLDC GLUCOMTR-MCNC: 149 MG/DL (ref 70–130)
HBA1C MFR BLD: 6.7 % (ref 4.5–5.7)
Lab: ABNORMAL
Lab: ABNORMAL

## 2024-05-15 PROCEDURE — 82043 UR ALBUMIN QUANTITATIVE: CPT | Performed by: INTERNAL MEDICINE

## 2024-05-15 PROCEDURE — 99214 OFFICE O/P EST MOD 30 MIN: CPT | Performed by: INTERNAL MEDICINE

## 2024-05-15 PROCEDURE — 83036 HEMOGLOBIN GLYCOSYLATED A1C: CPT | Performed by: INTERNAL MEDICINE

## 2024-05-15 PROCEDURE — 82947 ASSAY GLUCOSE BLOOD QUANT: CPT | Performed by: INTERNAL MEDICINE

## 2024-05-15 PROCEDURE — 82570 ASSAY OF URINE CREATININE: CPT | Performed by: INTERNAL MEDICINE

## 2024-05-15 NOTE — PROGRESS NOTES
"     Office Note      Date: 05/15/2024  Patient Name: Dom Medina  MRN: [id number]  : 1960    Chief Complaint   Patient presents with    Diabetes     Uncontrolled type 2 diabetes mellitus with hyperglycemia       History of Present Illness:   Dom Medina is a 63 y.o. male who presents for Diabetes type 2. Diagnosed in: . Treated in past with diet. Current treatments: metformin. Number of insulin shots per day: none. Checks blood sugar none times a day. Has low blood sugar: no. Aspirin use: No. Statin use: No. ACE-I/ARB use: Yes.  Change in health since last visit: none. Last eye exam: 2022.      Subjective      Diabetic Complications:  Eyes: No  Kidneys: Yes - microalbumin  Feet: No  Heart: No    Diet and Exercise:  Meals per day: 3  Minutes of exercise per week: 0 mins.    Review of Systems:   Review of Systems   Constitutional: Negative.    Cardiovascular: Negative.    Gastrointestinal:  Positive for constipation.   Endocrine: Negative.        The following portions of the patient's history were reviewed and updated as appropriate: allergies, current medications, past family history, past medical history, past social history, past surgical history, and problem list.    Objective     Visit Vitals  /68 (BP Location: Right arm, Patient Position: Sitting, Cuff Size: Adult)   Pulse 72   Ht 180.3 cm (70.98\")   Wt 103 kg (226 lb 9.6 oz)   SpO2 100%   BMI 31.62 kg/m²       Physical Exam:  Physical Exam  Constitutional:       Appearance: Normal appearance.   Neurological:      Mental Status: He is alert.       Labs:    HbA1c  Lab Results   Component Value Date    HGBA1C 6.7 (A) 05/15/2024       CMP  No results found for: \"GLUCOSE\", \"BUN\", \"CREATININE\", \"EGFRIFNONA\", \"EGFRIFAFRI\", \"BCR\", \"K\", \"CO2\", \"CALCIUM\", \"PROTENTOTREF\", \"LABIL2\", \"BILIRUBIN\", \"AST\", \"ALT\"     Lipid Panel        TSH  No results found for: \"TSH\", \"FREET4\"     Hemoglobin A1C  Lab Results   Component Value Date    HGBA1C 6.7 " (A) 05/15/2024        Microalbumin/Creatinine  Lab Results   Component Value Date    HALIE 86.6 (H) 11/13/2023    MICROALBUR 7.8 11/13/2023           Assessment / Plan      Assessment & Plan:  Diagnoses and all orders for this visit:    1. Type 2 diabetes mellitus with hyperglycemia, without long-term current use of insulin (Primary)  Assessment & Plan:  Diabetes is stable.   Continue current treatment regimen.  Diabetes will be reassessed in 6 months.    Orders:  -     POC Glycosylated Hemoglobin (Hb A1C)  -     POC Glucose, Blood    2. Benign hypertension  Assessment & Plan:  Hypertension is stable and controlled  Continue current treatment regimen.  Blood pressure will be reassessed in 6 months.      3. History of parathyroidectomy  Assessment & Plan:  Calcium has been normal.  Plan to recheck calcium next visit.      4. Microalbuminuria  Assessment & Plan:  Continue ARB.  Check microalbumin today.  If still high, consider starting SGLT-2 inhibitor.    Orders:  -     Microalbumin / Creatinine Urine Ratio - Urine, Clean Catch; Future      Current Outpatient Medications   Medication Instructions    albuterol sulfate HFA (PROVENTIL HFA;VENTOLIN HFA;PROAIR HFA) 180 mcg, Inhalation    amLODIPine (NORVASC) 10 mg, Oral, Daily    Blood Glucose Monitoring Suppl (OneTouch Verio Flex System) w/Device kit Every 24 Hours    cetirizine (ZYRTEC) 10 mg, Every 24 Hours    cyclobenzaprine (FLEXERIL) 10 mg, Oral, 3 Times Daily PRN    fluticasone (FLONASE) 50 MCG/ACT nasal spray USE 1 TO 2 SPRAYS IN EACH NOSTRIL ONCE A DAY    hydroCHLOROthiazide 12.5 mg, Oral, Every Morning    irbesartan-hydrochlorothiazide (AVALIDE) 300-12.5 MG tablet 1 tablet, Oral, Daily    Lancets (OneTouch Delica Plus [id number]) misc USE TO CHECK BLOOD GLUCOSE ONCE DAILY    Lancets (OneTouch Delica Plus [id number]) misc Every 24 Hours    meloxicam (MOBIC) 15 mg, Oral, As Needed    metFORMIN ER (GLUCOPHAGE-XR) 500 mg, Oral, Daily    Mirabegron ER  (MYRBETRIQ) 25 mg, Oral, Daily    mometasone (NASONEX) 50 MCG/ACT nasal spray USE 2 SPRAY IN EACH NOSTRIL TWO TIMES A DAY (EVERY 12 HOURS)    nebivolol (BYSTOLIC) 20 MG tablet Every 24 Hours    OneTouch Verio test strip USE TO CHECK BLOOD GLUCOSE ONCE DAILY    prednisoLONE acetate (PRED FORTE) 1 % ophthalmic suspension INSTILL 1 DROP INTO EACH AFFECTED EYE TWO TIMES A DAY (EVERY 12 HOURS) FOR 90 DAYS    tamsulosin (FLOMAX) 0.4 MG capsule 24 hr capsule 1 capsule, Oral, Daily      Return in about 3 months (around 8/15/2024) for Recheck with A1c, CMP, lipid, TSH, microalbumin, foot exam.    Electronically signed by: Satnam Tierney MD  05/15/2024

## 2024-05-16 LAB
ALBUMIN UR-MCNC: 3.5 MG/DL
CREAT UR-MCNC: 44.6 MG/DL
MICROALBUMIN/CREAT UR: 78.5 MG/G (ref 0–29)

## 2024-05-16 RX ORDER — DAPAGLIFLOZIN 5 MG/1
5 TABLET, FILM COATED ORAL DAILY
Qty: 90 TABLET | Refills: 3 | Status: SHIPPED | OUTPATIENT
Start: 2024-05-16

## 2024-05-17 ENCOUNTER — PRIOR AUTHORIZATION (OUTPATIENT)
Dept: ENDOCRINOLOGY | Facility: CLINIC | Age: 64
End: 2024-05-17
Payer: COMMERCIAL

## 2024-05-17 NOTE — TELEPHONE ENCOUNTER
PATRICK REYNOLDS (Key: [id number])  PA Case ID #: [id number]  Rx #: [id number]  Need Help? Call us at [phone]  Outcome  Approved today  Your PA request has been approved. Additional information will be provided in the approval communication. (Message 1143)  Authorization Expiration Date: 5/17/2027  Drug  Farxiga 5MG tablets  ePA cloud logo  Form  [facility] Electronic PA Form (2017 [employer])  Original Claim Info

## 2024-10-02 ENCOUNTER — TELEPHONE (OUTPATIENT)
Dept: ENDOCRINOLOGY | Facility: CLINIC | Age: 64
End: 2024-10-02

## 2024-10-02 NOTE — TELEPHONE ENCOUNTER
Tried calling pt to get scheduled with Dr. Aponte in [place] office since his wife sees him there as well. No answer, left vm to call back.

## 2024-10-15 ENCOUNTER — OFFICE VISIT (OUTPATIENT)
Age: 64
End: 2024-10-15
Payer: MEDICARE

## 2024-10-15 VITALS
DIASTOLIC BLOOD PRESSURE: 72 MMHG | OXYGEN SATURATION: 99 % | SYSTOLIC BLOOD PRESSURE: 118 MMHG | WEIGHT: 212 LBS | HEART RATE: 76 BPM | HEIGHT: 71 IN | BODY MASS INDEX: 29.68 KG/M2

## 2024-10-15 DIAGNOSIS — E11.65 TYPE 2 DIABETES MELLITUS WITH HYPERGLYCEMIA, WITHOUT LONG-TERM CURRENT USE OF INSULIN: Primary | ICD-10-CM

## 2024-10-15 LAB
EXPIRATION DATE: NORMAL
EXPIRATION DATE: NORMAL
GLUCOSE BLDC GLUCOMTR-MCNC: 120 MG/DL (ref 70–130)
HBA1C MFR BLD: 5.2 % (ref 4.5–5.7)
Lab: NORMAL
Lab: NORMAL

## 2024-10-15 PROCEDURE — 80053 COMPREHEN METABOLIC PANEL: CPT | Performed by: INTERNAL MEDICINE

## 2024-10-15 PROCEDURE — 36415 COLL VENOUS BLD VENIPUNCTURE: CPT | Performed by: INTERNAL MEDICINE

## 2024-10-15 PROCEDURE — 3044F HG A1C LEVEL LT 7.0%: CPT | Performed by: INTERNAL MEDICINE

## 2024-10-15 PROCEDURE — 99214 OFFICE O/P EST MOD 30 MIN: CPT | Performed by: INTERNAL MEDICINE

## 2024-10-15 PROCEDURE — 82947 ASSAY GLUCOSE BLOOD QUANT: CPT | Performed by: INTERNAL MEDICINE

## 2024-10-15 PROCEDURE — 3074F SYST BP LT 130 MM HG: CPT | Performed by: INTERNAL MEDICINE

## 2024-10-15 PROCEDURE — 3078F DIAST BP <80 MM HG: CPT | Performed by: INTERNAL MEDICINE

## 2024-10-15 PROCEDURE — 1160F RVW MEDS BY RX/DR IN RCRD: CPT | Performed by: INTERNAL MEDICINE

## 2024-10-15 PROCEDURE — 80061 LIPID PANEL: CPT | Performed by: INTERNAL MEDICINE

## 2024-10-15 PROCEDURE — 84403 ASSAY OF TOTAL TESTOSTERONE: CPT | Performed by: INTERNAL MEDICINE

## 2024-10-15 PROCEDURE — 1159F MED LIST DOCD IN RCRD: CPT | Performed by: INTERNAL MEDICINE

## 2024-10-15 PROCEDURE — 83036 HEMOGLOBIN GLYCOSYLATED A1C: CPT | Performed by: INTERNAL MEDICINE

## 2024-10-15 PROCEDURE — 84443 ASSAY THYROID STIM HORMONE: CPT | Performed by: INTERNAL MEDICINE

## 2024-10-15 NOTE — PROGRESS NOTES
"     Office Note      Date: 10/15/2024  Patient Name: Dom Medina  MRN: [id number]  : 1960    Chief Complaint   Patient presents with    Diabetes       History of Present Illness:   Dom Medina is a 64 y.o. male who presents for Diabetes type 2.   Current RX metformin  He never filled rx for farxiga. He was worried about SE    Bg checks are done: with ronald   Hypoglycemia : none       Last A1c:  Hemoglobin A1C   Date Value Ref Range Status   10/15/2024 5.2 4.5 - 5.7 % Final   2023 6.6  Final       Changes in health since last visit: none . Last eye exam  up to date.    Subjective            Review of Systems:   Review of Systems   Genitourinary:  Positive for frequency.       The following portions of the patient's history were reviewed and updated as appropriate: allergies, current medications, past family history, past medical history, past social history, past surgical history, and problem list.    Objective     Visit Vitals  /72 (BP Location: Left arm, Patient Position: Sitting, Cuff Size: Adult)   Pulse 76   Ht 180.3 cm (71\")   Wt 96.2 kg (212 lb)   SpO2 99%   BMI 29.57 kg/m²           Physical Exam:  Physical Exam  Vitals reviewed.   Constitutional:       Appearance: Normal appearance. He is normal weight.   Cardiovascular:      Pulses:           Dorsalis pedis pulses are 2+ on the right side and 2+ on the left side.        Posterior tibial pulses are 2+ on the right side and 2+ on the left side.   Musculoskeletal:      Right foot: Normal range of motion. No deformity, bunion, Charcot foot, foot drop or prominent metatarsal heads.      Left foot: Normal range of motion. No deformity, bunion, Charcot foot, foot drop or prominent metatarsal heads.   Feet:      Right foot:      Protective Sensation: 10 sites tested.  10 sites sensed.      Skin integrity: Skin integrity normal.      Toenail Condition: Right toenails are normal.      Left foot:      Protective Sensation: 10 sites tested.  " 10 sites sensed.      Skin integrity: Skin integrity normal.      Toenail Condition: Left toenails are normal.      Comments: Diabetic Foot Exam Performed    Neurological:      Mental Status: He is alert.          Assessment / Plan      Assessment & Plan:  Problem List Items Addressed This Visit       Type 2 diabetes mellitus with hyperglycemia, without long-term current use of insulin - Primary    Current Assessment & Plan      Improved with use of cgm. Don't think he needs metformin. Will stop that and recheck. Labs as orders. Stop hctz due to low bp          Relevant Orders    POC Glucose, Blood (Completed)    POC Glycosylated Hemoglobin (Hb A1C) (Completed)    TSH    Comprehensive Metabolic Panel    Testosterone    Lipid Panel         Electronically signed by : Justin Aponte MD  10/15/2024

## 2024-10-15 NOTE — ASSESSMENT & PLAN NOTE
Improved with use of cgm. Don't think he needs metformin. Will stop that and recheck. Labs as orders. Stop hctz due to low bp

## 2024-10-16 LAB
ALBUMIN SERPL-MCNC: 4.6 G/DL (ref 3.5–5.2)
ALBUMIN/GLOB SERPL: 1.9 G/DL
ALP SERPL-CCNC: 117 U/L (ref 39–117)
ALT SERPL W P-5'-P-CCNC: 25 U/L (ref 1–41)
ANION GAP SERPL CALCULATED.3IONS-SCNC: 10.8 MMOL/L (ref 5–15)
AST SERPL-CCNC: 22 U/L (ref 1–40)
BILIRUB SERPL-MCNC: 1.4 MG/DL (ref 0–1.2)
BUN SERPL-MCNC: 17 MG/DL (ref 8–23)
BUN/CREAT SERPL: 17.5 (ref 7–25)
CALCIUM SPEC-SCNC: 9.7 MG/DL (ref 8.6–10.5)
CHLORIDE SERPL-SCNC: 99 MMOL/L (ref 98–107)
CHOLEST SERPL-MCNC: 173 MG/DL (ref 0–200)
CO2 SERPL-SCNC: 26.2 MMOL/L (ref 22–29)
CREAT SERPL-MCNC: 0.97 MG/DL (ref 0.76–1.27)
EGFRCR SERPLBLD CKD-EPI 2021: 86.6 ML/MIN/1.73
GLOBULIN UR ELPH-MCNC: 2.4 GM/DL
GLUCOSE SERPL-MCNC: 116 MG/DL (ref 65–99)
HDLC SERPL-MCNC: 44 MG/DL (ref 40–60)
LDLC SERPL CALC-MCNC: 113 MG/DL (ref 0–100)
LDLC/HDLC SERPL: 2.55 {RATIO}
POTASSIUM SERPL-SCNC: 3.9 MMOL/L (ref 3.5–5.2)
PROT SERPL-MCNC: 7 G/DL (ref 6–8.5)
SODIUM SERPL-SCNC: 136 MMOL/L (ref 136–145)
TESTOST SERPL-MCNC: 552 NG/DL (ref 193–740)
TRIGL SERPL-MCNC: 83 MG/DL (ref 0–150)
TSH SERPL DL<=0.05 MIU/L-ACNC: 1.39 UIU/ML (ref 0.27–4.2)
VLDLC SERPL-MCNC: 16 MG/DL (ref 5–40)

## 2025-03-21 ENCOUNTER — OFFICE VISIT (OUTPATIENT)
Age: 65
End: 2025-03-21
Payer: MEDICARE

## 2025-03-21 VITALS
HEIGHT: 71 IN | DIASTOLIC BLOOD PRESSURE: 68 MMHG | HEART RATE: 69 BPM | OXYGEN SATURATION: 100 % | WEIGHT: 209 LBS | BODY MASS INDEX: 29.26 KG/M2 | SYSTOLIC BLOOD PRESSURE: 120 MMHG

## 2025-03-21 DIAGNOSIS — E11.65 TYPE 2 DIABETES MELLITUS WITH HYPERGLYCEMIA, WITHOUT LONG-TERM CURRENT USE OF INSULIN: ICD-10-CM

## 2025-03-21 LAB
EXPIRATION DATE: ABNORMAL
EXPIRATION DATE: NORMAL
GLUCOSE BLDC GLUCOMTR-MCNC: 133 MG/DL (ref 70–130)
HBA1C MFR BLD: 5.4 % (ref 4.5–5.7)
Lab: ABNORMAL
Lab: NORMAL

## 2025-03-21 NOTE — PROGRESS NOTES
"     Office Note      Date: 2025  Patient Name: Dom Medina  MRN: [id number]  : 1960    Chief Complaint   Patient presents with    Diabetes       History of Present Illness:   Dom Medina is a 64 y.o. male who presents for Diabetes type 2.   Current Rxnothing     Bg checks are done: daily   Hypoglycemia : none       Last A1c:  Hemoglobin A1C   Date Value Ref Range Status   2025 5.4 4.5 - 5.7 % Final   2023 6.6  Final       Changes in health since last visit:  has had issues with vision and sleeping . Last eye exam up to date.    Subjective              Review of Systems:   Review of Systems   Endocrine: Negative for polydipsia and polyuria.       The following portions of the patient's history were reviewed and updated as appropriate: allergies, current medications, past family history, past medical history, past social history, past surgical history, and problem list.    Objective     Visit Vitals  /68 (BP Location: Left arm, Patient Position: Sitting, Cuff Size: Adult)   Pulse 69   Ht 180.3 cm (71\")   Wt 94.8 kg (209 lb)   SpO2 100%   BMI 29.15 kg/m²           Physical Exam:  Physical Exam  Vitals reviewed.   Constitutional:       Appearance: Normal appearance.   Neurological:      Mental Status: He is alert.   Psychiatric:         Mood and Affect: Mood normal.         Behavior: Behavior normal.         Thought Content: Thought content normal.         Judgment: Judgment normal.          Assessment / Plan      Assessment & Plan:  Problem List Items Addressed This Visit       Type 2 diabetes mellitus with hyperglycemia, without long-term current use of insulin    Current Assessment & Plan    Eyes up to date  A1c at goal  On no medsl   Follow upin 6 onths         Relevant Orders    POC Glucose, Blood (Completed)    POC Glycosylated Hemoglobin (Hb A1C) (Completed)         Electronically signed by : Justin Aponte MD  2025  "

## 2025-07-28 ENCOUNTER — APPOINTMENT (OUTPATIENT)
Facility: HOSPITAL | Age: 65
End: 2025-07-28
Payer: MEDICARE

## 2025-07-28 ENCOUNTER — HOSPITAL ENCOUNTER (EMERGENCY)
Facility: HOSPITAL | Age: 65
Discharge: HOME | End: 2025-07-28
Attending: EMERGENCY MEDICINE | Admitting: EMERGENCY MEDICINE
Payer: MEDICARE

## 2025-07-28 VITALS
HEIGHT: 71 IN | TEMPERATURE: 97.9 F | OXYGEN SATURATION: 98 % | BODY MASS INDEX: 29.32 KG/M2 | RESPIRATION RATE: 16 BRPM | SYSTOLIC BLOOD PRESSURE: 151 MMHG | HEART RATE: 80 BPM | WEIGHT: 209.44 LBS | DIASTOLIC BLOOD PRESSURE: 88 MMHG

## 2025-07-28 DIAGNOSIS — Z87.898 HISTORY OF DIZZINESS: Primary | ICD-10-CM

## 2025-07-28 DIAGNOSIS — R42 VERTIGO: ICD-10-CM

## 2025-07-28 DIAGNOSIS — H93.13 BILATERAL TINNITUS: ICD-10-CM

## 2025-07-28 PROCEDURE — 70450 CT HEAD/BRAIN W/O DYE: CPT

## 2025-07-28 PROCEDURE — 99284 EMERGENCY DEPT VISIT MOD MDM: CPT | Performed by: EMERGENCY MEDICINE

## 2025-07-28 NOTE — FSED PROVIDER NOTE
Subjective  History of Present Illness:    Patient is a 65-year-old male who presents to the ED for symptoms of vertigo, tinnitus, pain in neck and ears, and full body tremors.  He has been experiencing these symptoms since October of 2024.  He currently sees  ophthalmology for management of glaucoma and is therefore unable to take meclizine for his vertigo symptoms.  He also sees immunology at this time and was recommended to begin taking Xyzal for treatment of his dizziness and vertigo.  He reports that the tremors were very significant last night and involved his whole body. Today, he has not been experiencing any tremors.  He reports that the symptoms have been worsening over the past few months but denies any development of new symptoms.  He reports associated nausea when the dizziness is at its worst.  He denies any shortness of breath, chest pain, abdominal pain, fevers, chills, or headaches.    Nurses Notes reviewed and agree, including vitals, allergies, social history and prior medical history.     REVIEW OF SYSTEMS: All systems reviewed and not pertinent unless noted.  Review of Systems   HENT:  Positive for ear pain and tinnitus.    Gastrointestinal:  Positive for nausea.   Neurological:  Positive for dizziness and tremors.   All other systems reviewed and are negative.      Past Medical History:   Diagnosis Date    Hypertension     Legally blind in right eye, as defined in [place]     Testosterone deficiency     Type 2 diabetes mellitus     Vertigo        Allergies:    Atropine and Ibuprofen      Past Surgical History:   Procedure Laterality Date    CHOLECYSTECTOMY      COLONOSCOPY      EYE SURGERY      HERNIA REPAIR      KNEE SURGERY      PARATHYROID GLAND SURGERY  2007    Remove Parathyroid tumor         Social History     Socioeconomic History    Marital status:    Tobacco Use    Smoking status: Never   Vaping Use    Vaping status: Former   Substance and Sexual Activity    Alcohol use: No     "Drug use: Never    Sexual activity: Yes     Partners: Female     Birth control/protection: Vasectomy         Family History   Problem Relation Age of Onset    Diabetes Mother     Hypertension Mother     Kidney disease Mother     Diabetes Father     Cancer Father          2020    Diabetes Maternal Grandmother     Diabetes Brother        Objective  Physical Exam:  /88   Pulse 80   Temp 97.9 °F (36.6 °C) (Oral)   Resp 16   Ht 180 cm (70.87\")   Wt 95 kg (209 lb 7 oz)   SpO2 98%   BMI 29.32 kg/m²      Physical Exam  Constitutional:       General: He is not in acute distress.     Appearance: He is not ill-appearing, toxic-appearing or diaphoretic.   HENT:      Head: Normocephalic and atraumatic.      Right Ear: Tympanic membrane, ear canal and external ear normal.      Left Ear: Tympanic membrane, ear canal and external ear normal.   Eyes:      Extraocular Movements: Extraocular movements intact.      Conjunctiva/sclera: Conjunctivae normal.   Cardiovascular:      Rate and Rhythm: Normal rate and regular rhythm.      Heart sounds: Normal heart sounds. No murmur heard.     No friction rub. No gallop.   Pulmonary:      Effort: Pulmonary effort is normal. No respiratory distress.      Breath sounds: Normal breath sounds. No wheezing, rhonchi or rales.   Abdominal:      General: Abdomen is flat.      Palpations: Abdomen is soft.      Tenderness: There is no abdominal tenderness.   Musculoskeletal:      Cervical back: Normal range of motion.   Skin:     General: Skin is warm and dry.      Capillary Refill: Capillary refill takes less than 2 seconds.   Neurological:      Mental Status: He is alert and oriented to person, place, and time.      Gait: Gait is intact.   Psychiatric:         Mood and Affect: Mood normal.         Behavior: Behavior normal.       ED Course:     Lab Results (last 24 hours)       ** No results found for the last 24 hours. **             CT Head Without Contrast  Result Date: " 7/28/2025  CT HEAD WO CONTRAST Date of Exam: 7/28/2025 3:31 PM EDT Indication: dizziness. Comparison: None available. Technique: Axial CT images were obtained of the head without contrast administration.  Automated exposure control and iterative construction methods were used. Findings: There is no evidence of hemorrhage. There is no mass effect or midline shift. Mild diffuse age-appropriate involutional changes There is no extracerebral collection. Ventricles are normal in size and configuration for patient's stated age. Posterior fossa is within normal limits. Calvarium and skull base appear intact.  Visualized sinuses show no air fluid levels. Visualized orbits are unremarkable.     Impression: Impression: 1.No acute intracranial finding. Electronically Signed: Yaw Ochoa MD  7/28/2025 3:48 PM EDT  Workstation ID: [id number]       MDM     Amount and/or Complexity of Data Reviewed  Tests in the radiology section of CPT®: reviewed    Patient is a 65-year-old male who presents to the ED for assessment of dizziness, tinnitus, vertigo, tremors, and otalgia.  The symptoms have been occurring since October 2024 but have been gradually worsening.  He denies any development of new symptoms.  My differential included but was not limited to, vertigo, brain tumor, otitis media, migraine.  Physical exam was not pertinent for any acute findings.  The patient's gait was intact with assistance from a cane as needed.  I discussed this patient's case with my attending, Dr. Broussard.  We decided to order a CT scan of the head and then refer the patient to neurology for further assessment due to the chronic nature of his symptoms.  I discussed this plan with the patient and he verbalized understanding and agreed.  CT scan of the head was then ordered and did not reveal any acute intercranial findings.  The patient was then provided with a referral to outpatient neurology for further assessment of his chronic symptoms.  He was  advised follow-up with his primary care provider in the next 3 to 5 days for recheck of today's symptoms.  He agreed to the plan of care.  He was advised to return to the ED for any worsening symptoms.  Patient was in no acute distress at the time of discharge.    Interventions: Medications administered as below    Medications - No data to display    -----  ED Disposition       ED Disposition   Discharge    Condition   Stable    Comment   --             Final diagnoses:   History of dizziness   Bilateral tinnitus   Vertigo      Your Follow-Up Providers       Sandra Lewis APRN. Go in 3 days.    Specialty: Nurse Practitioner  Follow up details: Recheck of today symptoms  [address] [facility]  [phone]               Go to  [facility] EMERGENCY DEPARTMENT [place].    Specialty: Emergency Medicine  Follow up details: As needed, If symptoms worsen  [address]  [facility] [address] [phone]             Kobe Conner MD .    Specialty: Neurology  [address] [facility] [address] [phone]                       Contact information for after-discharge care    Follow-up information has not been specified.                    Your medication list        CONTINUE taking these medications        Instructions Last Dose Given Next Dose Due   amLODIPine 5 MG tablet  Commonly known as: NORVASC      Take 1 tablet by mouth Daily.       cetirizine 10 MG tablet  Commonly known as: zyrTEC      1 tablet Daily.       irbesartan-hydrochlorothiazide 300-12.5 MG tablet  Commonly known as: AVALIDE      Take 1 tablet by mouth Daily.       Myrbetriq 25 MG tablet sustained-release 24 hour 24 hr tablet  Generic drug: Mirabegron ER      Take 1 tablet by mouth Daily.       nebivolol 10 MG tablet  Commonly known as: BYSTOLIC      2 (Two) Times a Day.       OneTouch Delica Plus [id number] misc      USE TO CHECK BLOOD GLUCOSE ONCE DAILY       OneTouch Verio Flex System  w/Device kit      Daily.       OneTouch Verio test strip  Generic drug: glucose blood      USE TO CHECK BLOOD GLUCOSE ONCE DAILY       prednisoLONE acetate 1 % ophthalmic suspension  Commonly known as: PRED FORTE      INSTILL 1 DROP INTO EACH AFFECTED EYE TWO TIMES A DAY (EVERY 12 HOURS) FOR 90 DAYS

## 2025-07-28 NOTE — DISCHARGE INSTRUCTIONS
I have provided you with a referral to neurology with [facility] and they will call you and make an appointment within the next 48 hours.  Please follow-up with your primary care provider in the next 3 to 5 days for recheck of symptoms.  Return to the ED for any worsening symptoms.